# Patient Record
Sex: MALE | Race: WHITE | NOT HISPANIC OR LATINO | Employment: OTHER | URBAN - METROPOLITAN AREA
[De-identification: names, ages, dates, MRNs, and addresses within clinical notes are randomized per-mention and may not be internally consistent; named-entity substitution may affect disease eponyms.]

---

## 2019-12-11 NOTE — PROGRESS NOTES
"  Non face to face prolonged services of clinical data and chart information reviewed for a total time of 30 performed on 12/11 for Callum CHANDLER Andreedestin    Reviewed were:    Referral    Previous encounters        Imaging any imaging to include mammography, MRI/tomography, ultrasound     Previous procedures biopsy procedures and pathologic analysis and interpretation        Notes Z15.01 (ICD-10-CM) - Genetic susceptibility to malignant neoplasm of breast    Media BRCA1 variant described    Miscellaneous reports    Patient summaries  Family history as documented by referring clinician        Counseling, testing information and materials prepared    \"I spent 30 minutes  from 0700 to 0730 reviewing past records, prior to visit pertaining to genetic risk.\"     Polo Vidales M.D.  edited  "

## 2019-12-12 ENCOUNTER — OFFICE VISIT (OUTPATIENT)
Dept: HEMATOLOGY ONCOLOGY | Facility: MEDICAL CENTER | Age: 54
End: 2019-12-12
Payer: COMMERCIAL

## 2019-12-12 VITALS
RESPIRATION RATE: 14 BRPM | BODY MASS INDEX: 33.97 KG/M2 | HEIGHT: 69 IN | HEART RATE: 88 BPM | SYSTOLIC BLOOD PRESSURE: 124 MMHG | TEMPERATURE: 98.1 F | OXYGEN SATURATION: 98 % | DIASTOLIC BLOOD PRESSURE: 78 MMHG

## 2019-12-12 DIAGNOSIS — Z15.01 BRCA1 GENE MUTATION POSITIVE IN MALE: ICD-10-CM

## 2019-12-12 DIAGNOSIS — Z15.03 BRCA1 GENE MUTATION POSITIVE IN MALE: ICD-10-CM

## 2019-12-12 DIAGNOSIS — Z15.09 BRCA1 GENE MUTATION POSITIVE IN MALE: ICD-10-CM

## 2019-12-12 PROCEDURE — 99202 OFFICE O/P NEW SF 15 MIN: CPT | Performed by: MEDICAL GENETICS

## 2019-12-12 ASSESSMENT — PATIENT HEALTH QUESTIONNAIRE - PHQ9: CLINICAL INTERPRETATION OF PHQ2 SCORE: 0

## 2019-12-12 NOTE — PROGRESS NOTES
GENETIC RISK ASSESSMENT    Callum Recio is a 54 y.o. year old patient who was referred  for cancer genetic risk assessment.    Chief Complaint: family history of cancer and daughter with BRCA1 (c. [id number] del [id number])    HPI: The patient does not carry a cancer diagnosis  His father (pancreas) brother, (colon) , mother (breast, bladder).  Daughter with BRCA1 variant as above  Patient found to have same variant  Review of personal and family histories suggested that a cancer genetic risk assessment was in order.    Review of Systems (ROS):  Constitutional N  Eyes N  ENT N   Respiratory N  Cardiovascular N  Gastrointestinal N  Genitourinary N  Musculoskeletal N  Skin N  Neurological N  Endocrine N  Psychiatric N  Hematologic/lymphatic N  Allergic/Immunologic none  All other systems reviewed and are negative.    History (Past/Family)  Family History:   Family History   Problem Relation Age of Onset   • Cancer Brother 55        Colon   • Cancer Paternal Grandmother         Colon      3 generation pedigree built   Yes   [place] empiric risk calculation No   [place] empiric risk calculation  No    Social History:       Social History     Socioeconomic History   • Marital status:      Spouse name: Not on file   • Number of children: Not on file   • Years of education: Not on file   • Highest education level: Not on file   Occupational History   • Not on file   Social Needs   • Financial resource strain: Not on file   • Food insecurity:     Worry: Not on file     Inability: Not on file   • Transportation needs:     Medical: Not on file     Non-medical: Not on file   Tobacco Use   • Smoking status: Never Smoker   • Smokeless tobacco: Never Used   Substance and Sexual Activity   • Alcohol use: Yes     Comment: 4x/wk   • Drug use: No   • Sexual activity: Not on file   Lifestyle   • Physical activity:     Days per week: Not on file     Minutes per session: Not on file   • Stress: Not on file   Relationships   •  Social connections:     Talks on phone: Not on file     Gets together: Not on file     Attends [employer] service: Not on file     Active member of club or organization: Not on file     Attends meetings of clubs or organizations: Not on file     Relationship status: Not on file   • Intimate partner violence:     Fear of current or ex partner: Not on file     Emotionally abused: Not on file     Physically abused: Not on file     Forced sexual activity: Not on file   Other Topics Concern   • Not on file   Social History Narrative   • Not on file       Patient Past History:  Past Medical History:   Diagnosis Date   • Cardiac abnormality     hole fix with a cardioseal   • Hypertension            [facility] Testing Criteria Met                            No    Physical Exam  Constitutional    There were no vitals taken for this visit.     No PE done         Assessment and Plan:  Patient with family history of BRCA1 pathogenic variant who possesses the same  Discussed etiology of cancer risk in BRCA1 (inefficient DNA repair and accumlation of errors)  Discussed implications for ovarian and breast cancers (females)  Discussed implications for pancreatic cancers (both females and males)  Discussed increased risks for prostate and breast in males    The patient's brother's colon cancer (age 55) has not been evaluated in this pedigree.  If further evaluation requested, please re-refer      I spent a total of 30 minutes of face to face time with >50% of time spent in counseling and coordination of care discussing matters stated in above note.    No testing ordered      Polo Vidales M.D.